# Patient Record
Sex: MALE | Race: BLACK OR AFRICAN AMERICAN | NOT HISPANIC OR LATINO | Employment: OTHER | URBAN - METROPOLITAN AREA
[De-identification: names, ages, dates, MRNs, and addresses within clinical notes are randomized per-mention and may not be internally consistent; named-entity substitution may affect disease eponyms.]

---

## 2017-01-04 ENCOUNTER — TELEPHONE (OUTPATIENT)
Dept: SLEEP MEDICINE | Facility: OTHER | Age: 47
End: 2017-01-04

## 2017-01-06 ENCOUNTER — HOSPITAL ENCOUNTER (EMERGENCY)
Facility: HOSPITAL | Age: 47
Discharge: HOME | End: 2017-01-06
Attending: EMERGENCY MEDICINE
Payer: MEDICAID

## 2017-01-06 VITALS
TEMPERATURE: 98 F | SYSTOLIC BLOOD PRESSURE: 133 MMHG | OXYGEN SATURATION: 93 % | RESPIRATION RATE: 22 BRPM | WEIGHT: 315 LBS | BODY MASS INDEX: 47.74 KG/M2 | HEIGHT: 68 IN | DIASTOLIC BLOOD PRESSURE: 72 MMHG | HEART RATE: 88 BPM

## 2017-01-06 DIAGNOSIS — R30.0 DYSURIA: ICD-10-CM

## 2017-01-06 DIAGNOSIS — R06.2 WHEEZING: Primary | ICD-10-CM

## 2017-01-06 DIAGNOSIS — R51.9 LEFT TEMPORAL HEADACHE: ICD-10-CM

## 2017-01-06 DIAGNOSIS — Z87.09 HISTORY OF COPD: ICD-10-CM

## 2017-01-06 LAB
BACTERIA #/AREA URNS HPF: NORMAL /HPF
BILIRUB UR QL STRIP: NEGATIVE
CLARITY UR: CLEAR
COLOR UR: ABNORMAL
GLUCOSE UR QL STRIP: ABNORMAL
HGB UR QL STRIP: NEGATIVE
HYALINE CASTS #/AREA URNS LPF: 1 /LPF
KETONES UR QL STRIP: NEGATIVE
LEUKOCYTE ESTERASE UR QL STRIP: NEGATIVE
MICROSCOPIC COMMENT: NORMAL
NITRITE UR QL STRIP: NEGATIVE
PH UR STRIP: 6 [PH] (ref 5–8)
PROT UR QL STRIP: ABNORMAL
RBC #/AREA URNS HPF: 1 /HPF (ref 0–4)
SP GR UR STRIP: 1 (ref 1–1.03)
TROPONIN I SERPL DL<=0.01 NG/ML-MCNC: <0.006 NG/ML
URN SPEC COLLECT METH UR: ABNORMAL
UROBILINOGEN UR STRIP-ACNC: NEGATIVE EU/DL
WBC #/AREA URNS HPF: 2 /HPF (ref 0–5)

## 2017-01-06 PROCEDURE — 94761 N-INVAS EAR/PLS OXIMETRY MLT: CPT

## 2017-01-06 PROCEDURE — 84484 ASSAY OF TROPONIN QUANT: CPT

## 2017-01-06 PROCEDURE — 94640 AIRWAY INHALATION TREATMENT: CPT

## 2017-01-06 PROCEDURE — 93005 ELECTROCARDIOGRAM TRACING: CPT

## 2017-01-06 PROCEDURE — 99284 EMERGENCY DEPT VISIT MOD MDM: CPT | Mod: 25

## 2017-01-06 PROCEDURE — 25000242 PHARM REV CODE 250 ALT 637 W/ HCPCS: Performed by: PHYSICIAN ASSISTANT

## 2017-01-06 PROCEDURE — 81000 URINALYSIS NONAUTO W/SCOPE: CPT

## 2017-01-06 RX ORDER — IPRATROPIUM BROMIDE AND ALBUTEROL SULFATE 2.5; .5 MG/3ML; MG/3ML
3 SOLUTION RESPIRATORY (INHALATION)
Status: COMPLETED | OUTPATIENT
Start: 2017-01-06 | End: 2017-01-06

## 2017-01-06 RX ORDER — AZITHROMYCIN 250 MG/1
250 TABLET, FILM COATED ORAL DAILY
Qty: 8 TABLET | Refills: 0 | Status: SHIPPED | OUTPATIENT
Start: 2017-01-06 | End: 2017-01-13

## 2017-01-06 RX ORDER — GABAPENTIN 400 MG/1
400 CAPSULE ORAL 3 TIMES DAILY
Qty: 90 CAPSULE | Refills: 0 | Status: SHIPPED | OUTPATIENT
Start: 2017-01-06 | End: 2017-09-17 | Stop reason: ALTCHOICE

## 2017-01-06 RX ADMIN — IPRATROPIUM BROMIDE AND ALBUTEROL SULFATE 3 ML: .5; 3 SOLUTION RESPIRATORY (INHALATION) at 03:01

## 2017-01-06 NOTE — ED TRIAGE NOTES
Pt states that have Lt ear pain over the entire left side of head and poor hearing.  Complaining of headache.  States that pain is 5 out of 10 at this time.

## 2017-01-06 NOTE — ED AVS SNAPSHOT
[facility]  [place] [address]               Rajendra Al Jr.   2017  2:20 PM   ED    Description:  Male : 1970   Department:  [facility]           Your Care was Coordinated By:     Provider Role From To    Srinivas Hart MD Attending Provider 17 1424 --    Ravi Kruger PA-C Physician Assistant 17 1421 --      Reason for Visit     Abscess           Diagnoses this Visit        Comments    Wheezing    -  Primary     History of COPD         Dysuria         Left temporal headache           ED Disposition     None           To Do List           Follow-up Information     Follow up with Aminata Hdez MD. Schedule an appointment as soon as possible for a visit in 1 day.    Specialty:  Internal Medicine    Why:  For reevaluation    Contact information:    [address] [facility] [address]  [facility] [address]  [phone]          Go to [facility].    Specialty:  Emergency Medicine    Why:  If symptoms worsen    Contact information:    [address] [place] [id number] [phone]       These Medications        Disp Refills Start End    azithromycin (Z-SOLITARIO) 250 MG tablet 8 tablet 0 2017    Take 1 tablet (250 mg total) by mouth once daily. Take first 2 tablets together, then 1 every day until finished. - Oral    Pharmacy: [facility]/pharmacy #[id number] - [place] - [address] [facility] Ph #: [phone]       gabapentin (NEURONTIN) 400 MG capsule 90 capsule 0 2017    Take 1 capsule (400 mg total) by mouth 3 (three) times daily. - Oral    Pharmacy: [facility]/pharmacy #[id number] - [place] - [address] [facility] Ph #: [phone]         [facility]     [facility] -  Assistance  Registered nurses in the [facility] provide clinical advisement, health education, appointment booking, and other advisory services.  Call for this free  service at [phone].             Medications           Message regarding Medications     Verify the changes and/or additions to your medication regime listed below are the same as discussed with your clinician today.  If any of these changes or additions are incorrect, please notify your healthcare provider.        START taking these NEW medications        Refills    azithromycin (Z-SOLITARIO) 250 MG tablet 0    Sig: Take 1 tablet (250 mg total) by mouth once daily. Take first 2 tablets together, then 1 every day until finished.    Class: Print    Route: Oral    gabapentin (NEURONTIN) 400 MG capsule 0    Sig: Take 1 capsule (400 mg total) by mouth 3 (three) times daily.    Class: Print    Route: Oral      These medications were administered today        Dose Freq    albuterol-ipratropium 2.5mg-0.5mg/3mL nebulizer solution 3 mL 3 mL ED 1 Time    Sig: Take 3 mLs by nebulization ED 1 Time.    Class: Normal    Route: Nebulization      STOP taking these medications     acetaminophen (TYLENOL) 500 MG tablet Take 1 tablet (500 mg total) by mouth every 6 (six) hours as needed for Pain.    aspirin (ECOTRIN) 81 MG EC tablet Take 1 tablet (81 mg total) by mouth once daily.    senna-docusate 8.6-50 mg (PERICOLACE) 8.6-50 mg per tablet Take 1 tablet by mouth 2 (two) times daily.           Verify that the below list of medications is an accurate representation of the medications you are currently taking.  If none reported, the list may be blank. If incorrect, please contact your healthcare provider. Carry this list with you in case of emergency.           Current Medications     albuterol (PROVENTIL HFA) 90 mcg/actuation inhaler Inhale 2 puffs into the lungs every 6 (six) hours as needed for Wheezing.    amlodipine (NORVASC) 10 MG tablet Take 10 mg by mouth once daily.     APIDRA SOLOSTAR 100 unit/mL InPn pen Inject 25 Units into the skin 3 (three) times daily.     atorvastatin (LIPITOR) 20 MG tablet Take 1 tablet (20 mg total)  "by mouth once daily.    BD INSULIN PEN NEEDLE UF SHORT 31 X 5/16 " Ndle     erythromycin 250 mg EC capsule Take 1 capsule (250 mg total) by mouth once daily.    fish oil-omega-3 fatty acids 300-1,000 mg capsule Take 1 g by mouth once daily.     fluticasone (FLONASE) 50 mcg/actuation nasal spray 1 spray by Each Nare route once daily.    furosemide (LASIX) 40 MG tablet Take 40 mg by mouth once daily.     hydrOXYzine (ATARAX) 50 MG tablet Take 50 mg by mouth every evening.     LANTUS SOLOSTAR 100 unit/mL (3 mL) InPn pen Inject 80 Units into the skin every evening.     lisinopril (PRINIVIL,ZESTRIL) 40 MG tablet Take 40 mg by mouth once daily.    metoprolol succinate (TOPROL-XL) 100 MG 24 hr tablet Take 150 mg by mouth once daily.     potassium chloride SA (K-DUR,KLOR-CON) 20 MEQ tablet Take 1 tablet (20 mEq total) by mouth 2 (two) times daily.    sertraline (ZOLOFT) 50 MG tablet Take 50 mg by mouth once daily.    spironolactone (ALDACTONE) 25 MG tablet Take 25 mg by mouth once daily.     trazodone (DESYREL) 150 MG tablet Take 100 mg by mouth nightly.     TRUETEST TEST STRIPS Strp Check blood sugar once daily    azithromycin (Z-SOLITARIO) 250 MG tablet Take 1 tablet (250 mg total) by mouth once daily. Take first 2 tablets together, then 1 every day until finished.    cyclobenzaprine (FLEXERIL) 10 MG tablet Take 10 mg by mouth every evening.     gabapentin (NEURONTIN) 400 MG capsule Take 1 capsule (400 mg total) by mouth 3 (three) times daily.           Clinical Reference Information           Your Vitals Were     BP Pulse Temp Resp Height Weight    133/72 (BP Location: Left arm, Patient Position: Sitting, BP Method: Automatic) 88 98 °F (36.7 °C) 22 5' 8" (1.727 m) 149.7 kg (330 lb)    SpO2 BMI             93% 50.18 kg/m2         Allergies as of 1/6/2017     No Known Allergies      Immunizations Administered on Date of Encounter - 1/6/2017     None      ED Micro, Lab, POCT     Start Ordered       Status Ordering Provider    " 01/06/17 1453 01/06/17 1452  Troponin I  STAT      Final result     01/06/17 1446 01/06/17 1446  Urinalysis  STAT      Final result     01/06/17 1446 01/06/17 1446  Urinalysis Microscopic  Once      Final result       ED Imaging Orders     Start Ordered       Status Ordering Provider    01/06/17 1448 01/06/17 1449  X-Ray Chest PA And Lateral  1 time imaging      Final result       Discharge References/Attachments     TRIGEMINAL NEURALGIA (ENGLISH)      Your Scheduled Appointments     Jan 11, 2017 10:00 AM CST   Established Patient Visit with Rigo oRe MD   [facility] ([facility] )    [address]  [place] [address] [phone]            Jan 12, 2017  7:30 PM CST   Sleep Study with LAB, SLEEP   [facility] ([facility])    [address] [facility] [address] [phone]              Smoking Cessation     If you would like to quit smoking:   You may be eligible for free services if you are a [place] resident and started smoking cigarettes before September 1, 1988.  Call the Smoking Cessation Trust (SCT) toll free at (971) 092-2266 or (457) 530-8231.   Call 0-800-QUIT-NOW if you do not meet the above criteria.             [facility] complies with applicable Federal civil rights laws and does not discriminate on the basis of race, color, national origin, age, disability, or sex.        Language Assistance Services     ATTENTION: Language assistance services are available, free of charge. Please call [phone].      ATENCIÓN: Si habla español, tiene a miguel disposición servicios gratuitos de asistencia lingüística. Llame al [phone].     CHÚ Ý: N?u b?n nói Ti?ng Vi?t, có các d?ch v? h? tr? ngôn ng? mi?n phí dành cho b?n. G?i s? [phone].

## 2017-01-06 NOTE — ED PROVIDER NOTES
"Encounter Date: 1/6/2017    SCRIBE #1 NOTE: I, Selam Dejesus, am scribing for, and in the presence of,  Ravi DIAZ. I have scribed the following portions of the note - Other sections scribed: HPI, ROS.       History     Chief Complaint   Patient presents with    Abscess     pt states " I feel like an abscess developing to the (L) side of my head and I'm having (L) ear pain x 3 days."     Review of patient's allergies indicates:  No Known Allergies  HPI Comments: CC: Facial Pain    45 y/o male with schizophrenia, CHF, COPD, CAD, DM, HTN, and obesity presents to the ED c/o 3 day hx of acute onset L sided facial pain that radiates to L ear and to parietal head. Symptoms are moderate and intermittent. Pt attempted ear drops this morning with no relief. Pt also c/o dysuria, mild chest pain, and intermittent cough. Pt denies being sexually active. Pt reports frequent UTI in the past. Pt denies penile discharge, fever, cough, chills, testicular pain, hematuria, or jaw pain. No alleviating/exacerbating factors or other symptoms reported.    The history is provided by the patient. No  was used.     Past Medical History   Diagnosis Date    CHF (congestive heart failure)     COPD (chronic obstructive pulmonary disease)     Coronary artery disease     Diabetes mellitus     H/O medication noncompliance     Hypertension     ILD (interstitial lung disease)     Obesity     Ptosis of eyelid, bilateral      Past Medical History Pertinent Negatives   Diagnosis Date Noted    Anticoagulant long-term use 7/26/2013    Arthritis 7/26/2013    Asthma 7/26/2013    Blood transfusion 7/26/2013    Cancer 7/26/2013    Seizures 7/26/2013    Stroke 7/26/2013    Thyroid disease 7/26/2013    Transfusion reaction 7/26/2013     Past Surgical History   Procedure Laterality Date    Lung biopsy      Cataract extraction, bilateral       as child    Belpharoptosis repair      Left heart catherization   "    Eye surgery       Family History   Problem Relation Age of Onset    Cancer Maternal Grandmother     Hyperlipidemia Father     Diabetes Mother     Diabetes Sister     Heart disease Sister     Hypertension Sister     Diabetes Brother     Diabetes Daughter     Diabetes Brother     Diabetes Brother     Diabetes Sister      Social History   Substance Use Topics    Smoking status: Former Smoker     Packs/day: 0.25     Years: 3.00     Quit date: 5/1/2012    Smokeless tobacco: None    Alcohol use No      Comment: occassionally     Review of Systems   Constitutional: Negative for chills and fever.   HENT: Positive for ear pain (L sided). Negative for rhinorrhea.         (+) L sided facial pain that radiates to L sided ear and to parietal head   Eyes: Negative for redness.   Respiratory: Positive for cough (intermittent) and shortness of breath (slightly increased from baseline).    Cardiovascular: Positive for chest pain (mild).   Gastrointestinal: Negative for abdominal pain, diarrhea, nausea and vomiting.   Genitourinary: Positive for dysuria. Negative for difficulty urinating, discharge and hematuria.   Musculoskeletal: Negative for gait problem and neck pain.   Skin: Negative for rash.   Neurological: Positive for headaches (parietal).       Physical Exam   Initial Vitals   BP Pulse Resp Temp SpO2   01/06/17 1251 01/06/17 1251 01/06/17 1251 01/06/17 1251 01/06/17 1251   137/90 96 24 98 °F (36.7 °C) 93 %     Physical Exam    Nursing note and vitals reviewed.  Constitutional: He appears well-developed and well-nourished. He is not diaphoretic. No distress.   HENT:   Head: Normocephalic and atraumatic.   Right Ear: External ear normal.   Left Ear: External ear normal.   Nose: Nose normal.   Mouth/Throat: Oropharynx is clear and moist. No oropharyngeal exudate.   No reproducible TTP, swelling, warmth, or erythema to jaw, face, or scalp. No pain with fully opening or closing jaw. No evidence of dental  abscess or PTA. No mastoid TTP. TM grey and intact. No erythema or swelling of throat.   Eyes: Conjunctivae and EOM are normal. Pupils are equal, round, and reactive to light. Right eye exhibits no discharge. Left eye exhibits no discharge.   Neck: Normal range of motion. No tracheal deviation present. No JVD present.   Cardiovascular: Normal rate, regular rhythm and normal heart sounds. Exam reveals no friction rub.    No murmur heard.  Pulmonary/Chest: No accessory muscle usage or stridor. No tachypnea. No respiratory distress. He has no decreased breath sounds. He has wheezes (diffuse end expiratory). He has no rhonchi. He has no rales. He exhibits no tenderness.   Personal portable oxygen cylinder in room, not attached to patient.    Musculoskeletal: Normal range of motion.   Lymphadenopathy:     He has no cervical adenopathy.   Neurological: He is alert and oriented to person, place, and time.   Skin: Skin is warm and dry. No rash and no abscess noted. No erythema. No pallor.         ED Course   Procedures  Labs Reviewed   URINALYSIS - Abnormal; Notable for the following:        Result Value    Protein, UA 1+ (*)     Glucose, UA 1+ (*)     All other components within normal limits   TROPONIN I   URINALYSIS MICROSCOPIC     EKG Readings: (Independently Interpreted)   Patient is in a normal sinus rhythm with a heart rate of 86.  The WA intervals normal QRS intervals normal.  The patient is a prolonged QT interval.  There are no significant ST segment changes that some nonspecific T-wave changes.  Her is poor R-wave progression across precordium.  There is no evidence of acute myocardial infarction or malignant arrhythmia.       X-Rays:   Independently Interpreted Readings:   Chest X-Ray: Possible PNA     Medical Decision Making:   History:   Old Medical Records: I decided to obtain old medical records.    This is an emergent evaluation of a 46 y.o. male with a PMHx of schizophrenia, HTN, CHF, DM, COPD, and CAD  "presenting to the ED for multiple complaints. Notes L temporal HA pain; atraumatic. Also notes slightly increased work of breathing from his baseline and "slight" CP. Also notes dysuria with no testicle pain or abdominal pain. Denies fever, nausea, vomiting, and sore throat. RR 24, /90, SpO2 93% RA, afebrile (similar to baseline per chart review). Patient is non-toxic appearing and in no acute distress. No respiratory distress but does have diffuse end expiratory wheezing. No CVA TTP. No TTP of jaw, face, or head. No evidence of trauma or infection to head. Fully ranges jaw. UA shows no evidence of infection and I doubt pyelonephritis. Troponin WNL; ACS unlikely. CXR shows possible PNA, which I will cover with azithromycin. No evidence of AAA or PTX. Duoneb in ED improves symptoms. Dr. Hart evaluates patient for facial pain- advises it is likely neurological and to cover with gabapentin. No abscess or cellulitis. No dental abscess. No AOM, otitis externa, or mastoiditis. I doubt meningitis.     Discharged home with azithromycin and gabapentin. Instructed to follow up with PCP for reevaluation and management of symptoms.     I discussed with the patient the diagnosis, treatment plan, indications for return to the emergency department, and for expected follow-up. The patient verbalized an understanding. The patient is asked if there are any questions or concerns. We discuss the case, until all issues are addressed to the patients satisfaction. Patient understands and is agreeable to the plan.     I discussed this patient with Dr. Hart who also evaluated the patient and is in agreement with my assessment and plan.           Scribe Attestation:   Scribe #1: I performed the above scribed service and the documentation accurately describes the services I performed. I attest to the accuracy of the note.    Attending Attestation:     Physician Attestation Statement for NP/PA:   I have conducted a face to face " encounter with this patient in addition to the NP/PA, due to    Other NP/PA Attestation Additions:     Physical Exam: Patient is morbidly obese.  The patient has mild bilateral wheezing.  There is no evidence of respiratory distress.  The left tympanic membrane is normal the canal is normal.  There is no swelling tenderness or mass on the left parietal scalp.  The neck is supple.  Mental status examination, cranial nerves, motor and sensory examination are normal.   Medical Decision Making: Given the above, I'm wondering whether this patient has neuropathic pain in the left parietal scalp.  Tic douloureux is considered.  This patient comes off and on is brief and involves the ear it involves the left parietal head.  There is no evidence of otitis media or otitis externa odontogenic infection.  The patient has very vague chest pain complaints.  He's been evaluated for chest pain in the past.  His EKG was unremarkable.  We will look at her chest x-ray.  We will check urine to be sure that there is no urinary tract infection.       Physician Attestation for Scribe:  Physician Attestation Statement for Scribe #1: I, Ravi Richards PA-C, reviewed documentation, as scribed by Selam Dejesus in my presence, and it is both accurate and complete.                 ED Course     Clinical Impression:   The primary encounter diagnosis was Wheezing. Diagnoses of History of COPD, Dysuria, and Left temporal headache were also pertinent to this visit.          Ravi Kruger PA-C  01/06/17 1862       Srinivas Hart MD  01/07/17 7919

## 2017-01-13 ENCOUNTER — PATIENT MESSAGE (OUTPATIENT)
Dept: ADMINISTRATIVE | Facility: OTHER | Age: 47
End: 2017-01-13

## 2017-01-13 ENCOUNTER — TELEPHONE (OUTPATIENT)
Dept: SLEEP MEDICINE | Facility: OTHER | Age: 47
End: 2017-01-13

## 2017-01-18 ENCOUNTER — TELEPHONE (OUTPATIENT)
Dept: SLEEP MEDICINE | Facility: OTHER | Age: 47
End: 2017-01-18

## 2017-01-18 ENCOUNTER — PATIENT MESSAGE (OUTPATIENT)
Dept: ADMINISTRATIVE | Facility: OTHER | Age: 47
End: 2017-01-18

## 2017-01-18 ENCOUNTER — HOSPITAL ENCOUNTER (EMERGENCY)
Facility: HOSPITAL | Age: 47
Discharge: LEFT BEFORE DISCHARGE | End: 2017-01-18
Payer: MEDICAID

## 2017-01-18 VITALS
WEIGHT: 315 LBS | DIASTOLIC BLOOD PRESSURE: 104 MMHG | SYSTOLIC BLOOD PRESSURE: 185 MMHG | RESPIRATION RATE: 22 BRPM | HEART RATE: 74 BPM | HEIGHT: 68 IN | TEMPERATURE: 98 F | BODY MASS INDEX: 47.74 KG/M2 | OXYGEN SATURATION: 94 %

## 2017-01-18 LAB
ALBUMIN SERPL BCP-MCNC: 3.4 G/DL
ALP SERPL-CCNC: 94 U/L
ALT SERPL W/O P-5'-P-CCNC: 13 U/L
ANION GAP SERPL CALC-SCNC: 9 MMOL/L
AST SERPL-CCNC: 10 U/L
BACTERIA #/AREA URNS HPF: NORMAL /HPF
BASOPHILS # BLD AUTO: 0.02 K/UL
BASOPHILS NFR BLD: 0.2 %
BILIRUB SERPL-MCNC: 0.4 MG/DL
BILIRUB UR QL STRIP: NEGATIVE
BUN SERPL-MCNC: 16 MG/DL
CALCIUM SERPL-MCNC: 9.5 MG/DL
CHLORIDE SERPL-SCNC: 101 MMOL/L
CLARITY UR: CLEAR
CO2 SERPL-SCNC: 28 MMOL/L
COLOR UR: YELLOW
CREAT SERPL-MCNC: 1.6 MG/DL
DIFFERENTIAL METHOD: ABNORMAL
EOSINOPHIL # BLD AUTO: 0.3 K/UL
EOSINOPHIL NFR BLD: 3.5 %
ERYTHROCYTE [DISTWIDTH] IN BLOOD BY AUTOMATED COUNT: 17 %
EST. GFR  (AFRICAN AMERICAN): 58 ML/MIN/1.73 M^2
EST. GFR  (NON AFRICAN AMERICAN): 50 ML/MIN/1.73 M^2
GLUCOSE SERPL-MCNC: 208 MG/DL
GLUCOSE UR QL STRIP: ABNORMAL
HCT VFR BLD AUTO: 49.4 %
HGB BLD-MCNC: 15.3 G/DL
HGB UR QL STRIP: NEGATIVE
HYALINE CASTS #/AREA URNS LPF: 0 /LPF
KETONES UR QL STRIP: NEGATIVE
LEUKOCYTE ESTERASE UR QL STRIP: NEGATIVE
LYMPHOCYTES # BLD AUTO: 1.5 K/UL
LYMPHOCYTES NFR BLD: 16.4 %
MCH RBC QN AUTO: 25 PG
MCHC RBC AUTO-ENTMCNC: 31 %
MCV RBC AUTO: 81 FL
MICROSCOPIC COMMENT: NORMAL
MONOCYTES # BLD AUTO: 0.8 K/UL
MONOCYTES NFR BLD: 8.6 %
NEUTROPHILS # BLD AUTO: 6.6 K/UL
NEUTROPHILS NFR BLD: 71.3 %
NITRITE UR QL STRIP: NEGATIVE
PH UR STRIP: 5 [PH] (ref 5–8)
PLATELET # BLD AUTO: 274 K/UL
PMV BLD AUTO: 8.8 FL
POCT GLUCOSE: 179 MG/DL (ref 70–110)
POTASSIUM SERPL-SCNC: 4.8 MMOL/L
PROT SERPL-MCNC: 8.1 G/DL
PROT UR QL STRIP: ABNORMAL
RBC # BLD AUTO: 6.12 M/UL
RBC #/AREA URNS HPF: 0 /HPF (ref 0–4)
SODIUM SERPL-SCNC: 138 MMOL/L
SP GR UR STRIP: 1.01 (ref 1–1.03)
SQUAMOUS #/AREA URNS HPF: 1 /HPF
URN SPEC COLLECT METH UR: ABNORMAL
UROBILINOGEN UR STRIP-ACNC: NEGATIVE EU/DL
WBC # BLD AUTO: 9.19 K/UL
WBC #/AREA URNS HPF: 1 /HPF (ref 0–5)

## 2017-01-18 PROCEDURE — 82962 GLUCOSE BLOOD TEST: CPT

## 2017-01-18 PROCEDURE — 81000 URINALYSIS NONAUTO W/SCOPE: CPT

## 2017-01-18 PROCEDURE — 85025 COMPLETE CBC W/AUTO DIFF WBC: CPT

## 2017-01-18 PROCEDURE — 80053 COMPREHEN METABOLIC PANEL: CPT

## 2017-01-18 PROCEDURE — 99283 EMERGENCY DEPT VISIT LOW MDM: CPT | Mod: 25

## 2017-01-19 NOTE — ED TRIAGE NOTES
Pt arrives to ED via personal transportation with c/o decreased urine output x couple days. Reports taking at home O2 at 3L, RA O2 sats 94% in triage. Hx of COPD and CHF. Denies SOB or any other symptoms at this time.

## 2017-01-19 NOTE — ED NOTES
"Pt to triage desk states, "who do I need to talk to because I want my gender changed on my chart."   He is upset and not wanting to answer questions.    "

## 2017-01-19 NOTE — ED NOTES
"Pt seems upset and voices his concerns to Agnes THOMAS, pt states "i want my gender changed on my chart."  "

## 2017-01-19 NOTE — ED PROVIDER NOTES
Triage Assessment:  I have evaluated and performed a medical screening assessment on this patient while awaiting a thorough evaluation and treatment in an ED bed. All of the emergency department beds are occupied at this time. When appropriate, laboratory testing and radiology studies will be ordered from triage. The patient has been advised of this process and care plan.    This is a 47 year old male with HTN, CHF, DM and COPD who presents to the ED complaining of concern for fluid overload. He reports dyspnea on exertion. He has been taking his prescribed Lasix, but believes the pills are fake and is requesting IV Lasix. He denies leg swelling, CP, N/V/D. He is on 2L O2 nasal cannula at home. While in the lobby, patient became agitated and began to leave, but was shortly escorted to triage. He states he is upset because in his chart he is designated as a male, but recently began to question his gender. He is not taking hormones or had surgery. He denies homicidal/suicidal ideation. He states is agreeable to blood work but refuses a chest xray and EKG. I informed him that it is necessary to check electrolytes prior to Lasix administration.      Orders pending:  () none  () Radiology studies  (X) Labs: UA, CBC, CMP  () medication    Dispo:  (X) Qtrack  () Main ED     Agnes Ochoa NP  01/18/17 0654

## 2017-01-20 ENCOUNTER — NURSE TRIAGE (OUTPATIENT)
Dept: ADMINISTRATIVE | Facility: CLINIC | Age: 47
End: 2017-01-20

## 2017-01-21 NOTE — TELEPHONE ENCOUNTER
Reason for Disposition   Lab result questions   Caller requesting lab results    Protocols used: ST INFORMATION ONLY CALL-A-AH, ST PCP CALL - NO TRIAGE-A-  States lab and urinalysis were done in ER but he didnt get results    Advised pt to call pcp Monday for results    Madelyn Baltazar RN

## 2017-02-01 ENCOUNTER — TELEPHONE (OUTPATIENT)
Dept: SLEEP MEDICINE | Facility: OTHER | Age: 47
End: 2017-02-01

## 2017-02-27 ENCOUNTER — HOSPITAL ENCOUNTER (EMERGENCY)
Facility: HOSPITAL | Age: 47
Discharge: LEFT BEFORE DISCHARGE | End: 2017-02-28
Attending: EMERGENCY MEDICINE
Payer: MEDICAID

## 2017-02-27 DIAGNOSIS — R07.9 CHEST PAIN, UNSPECIFIED TYPE: Primary | ICD-10-CM

## 2017-02-27 LAB
ALBUMIN SERPL BCP-MCNC: 3.2 G/DL
ALP SERPL-CCNC: 85 U/L
ALT SERPL W/O P-5'-P-CCNC: 13 U/L
ANION GAP SERPL CALC-SCNC: 9 MMOL/L
ANISOCYTOSIS BLD QL SMEAR: SLIGHT
AST SERPL-CCNC: 27 U/L
BASOPHILS # BLD AUTO: 0.02 K/UL
BASOPHILS NFR BLD: 0.2 %
BILIRUB SERPL-MCNC: 0.6 MG/DL
BNP SERPL-MCNC: 11 PG/ML
BUN SERPL-MCNC: 15 MG/DL
CALCIUM SERPL-MCNC: 9 MG/DL
CHLORIDE SERPL-SCNC: 106 MMOL/L
CO2 SERPL-SCNC: 22 MMOL/L
CREAT SERPL-MCNC: 1.3 MG/DL
DIFFERENTIAL METHOD: ABNORMAL
EOSINOPHIL # BLD AUTO: 0.1 K/UL
EOSINOPHIL NFR BLD: 1.5 %
ERYTHROCYTE [DISTWIDTH] IN BLOOD BY AUTOMATED COUNT: 18.3 %
EST. GFR  (AFRICAN AMERICAN): >60 ML/MIN/1.73 M^2
EST. GFR  (NON AFRICAN AMERICAN): >60 ML/MIN/1.73 M^2
GLUCOSE SERPL-MCNC: 117 MG/DL
HCT VFR BLD AUTO: 47.9 %
HGB BLD-MCNC: 15.4 G/DL
INR PPP: 1.1
LYMPHOCYTES # BLD AUTO: 1.5 K/UL
LYMPHOCYTES NFR BLD: 15.2 %
MAGNESIUM SERPL-MCNC: 2.3 MG/DL
MCH RBC QN AUTO: 25.5 PG
MCHC RBC AUTO-ENTMCNC: 32.2 %
MCV RBC AUTO: 79 FL
MONOCYTES # BLD AUTO: 0.7 K/UL
MONOCYTES NFR BLD: 7.3 %
NEUTROPHILS # BLD AUTO: 7.2 K/UL
NEUTROPHILS NFR BLD: 75.8 %
PLATELET # BLD AUTO: 233 K/UL
PLATELET BLD QL SMEAR: ABNORMAL
PMV BLD AUTO: 9.7 FL
POLYCHROMASIA BLD QL SMEAR: ABNORMAL
POTASSIUM SERPL-SCNC: 4.7 MMOL/L
PROT SERPL-MCNC: 7.8 G/DL
PROTHROMBIN TIME: 11.2 SEC
RBC # BLD AUTO: 6.04 M/UL
SODIUM SERPL-SCNC: 137 MMOL/L
TROPONIN I SERPL DL<=0.01 NG/ML-MCNC: <0.006 NG/ML
WBC # BLD AUTO: 9.56 K/UL

## 2017-02-27 PROCEDURE — 85025 COMPLETE CBC W/AUTO DIFF WBC: CPT

## 2017-02-27 PROCEDURE — 25000003 PHARM REV CODE 250: Performed by: EMERGENCY MEDICINE

## 2017-02-27 PROCEDURE — 99285 EMERGENCY DEPT VISIT HI MDM: CPT | Mod: 25

## 2017-02-27 PROCEDURE — 10061 I&D ABSCESS COMP/MULTIPLE: CPT | Mod: ,,, | Performed by: EMERGENCY MEDICINE

## 2017-02-27 PROCEDURE — 80053 COMPREHEN METABOLIC PANEL: CPT

## 2017-02-27 PROCEDURE — 93010 ELECTROCARDIOGRAM REPORT: CPT | Mod: ,,, | Performed by: INTERNAL MEDICINE

## 2017-02-27 PROCEDURE — 83880 ASSAY OF NATRIURETIC PEPTIDE: CPT

## 2017-02-27 PROCEDURE — 85610 PROTHROMBIN TIME: CPT

## 2017-02-27 PROCEDURE — 99285 EMERGENCY DEPT VISIT HI MDM: CPT | Mod: 25,,, | Performed by: EMERGENCY MEDICINE

## 2017-02-27 PROCEDURE — 93005 ELECTROCARDIOGRAM TRACING: CPT

## 2017-02-27 PROCEDURE — 84484 ASSAY OF TROPONIN QUANT: CPT

## 2017-02-27 PROCEDURE — 83735 ASSAY OF MAGNESIUM: CPT

## 2017-02-27 RX ORDER — ASPIRIN 325 MG
325 TABLET ORAL
Status: COMPLETED | OUTPATIENT
Start: 2017-02-27 | End: 2017-02-27

## 2017-02-27 RX ADMIN — ASPIRIN 325 MG ORAL TABLET 325 MG: 325 PILL ORAL at 10:02

## 2017-02-27 NOTE — ED AVS SNAPSHOT
[facility]  [address]  [place] [address]               Rajendra Al Jr.   2017  9:41 PM   ED    Description:  Male : 1970   Department:  [facility]           Your Care was Coordinated By:     Provider Role From To    Armand Juan MD Attending Provider [phone] --      Reason for Visit     Chest Pain     multiple medical complaints           Diagnoses this Visit        Comments    Chest pain, unspecified type    -  Primary       ED Disposition     None           To Do List           Follow-up Information     Follow up with [facility]. Schedule an appointment as soon as possible for a visit in 2 days.    Specialty:  Cardiology    Why:  If symptoms worsen    Contact information:    [address]  [facility] [id number] [phone]    Additional information:    3rd floor      [facility]     [facility] -  Assistance  Registered nurses in the [facility] provide clinical advisement, health education, appointment booking, and other advisory services.  Call for this free service at [phone].             Medications           Message regarding Medications     Verify the changes and/or additions to your medication regime listed below are the same as discussed with your clinician today.  If any of these changes or additions are incorrect, please notify your healthcare provider.        These medications were administered today        Dose Freq    aspirin tablet 325 mg 325 mg ED 1 Time    Sig: Take 1 tablet (325 mg total) by mouth ED 1 Time.    Class: Normal    Route: Oral           Verify that the below list of medications is an accurate representation of the medications you are currently taking.  If none reported, the list may be blank. If incorrect, please contact your healthcare provider. Carry this list with you in case of emergency.           Current  "Medications     albuterol (PROVENTIL HFA) 90 mcg/actuation inhaler Inhale 2 puffs into the lungs every 6 (six) hours as needed for Wheezing.    amlodipine (NORVASC) 10 MG tablet Take 10 mg by mouth once daily.     APIDRA SOLOSTAR 100 unit/mL InPn pen Inject 25 Units into the skin 3 (three) times daily.     atorvastatin (LIPITOR) 20 MG tablet Take 1 tablet (20 mg total) by mouth once daily.    BD INSULIN PEN NEEDLE UF SHORT 31 X 5/16 " Ndle     cyclobenzaprine (FLEXERIL) 10 MG tablet Take 10 mg by mouth every evening.     fish oil-omega-3 fatty acids 300-1,000 mg capsule Take 1 g by mouth once daily.     fluticasone (FLONASE) 50 mcg/actuation nasal spray 1 spray by Each Nare route once daily.    furosemide (LASIX) 40 MG tablet Take 40 mg by mouth once daily.     hydrOXYzine (ATARAX) 50 MG tablet Take 50 mg by mouth every evening.     LANTUS SOLOSTAR 100 unit/mL (3 mL) InPn pen Inject 80 Units into the skin every evening.     lisinopril (PRINIVIL,ZESTRIL) 40 MG tablet Take 40 mg by mouth once daily.    metoprolol succinate (TOPROL-XL) 100 MG 24 hr tablet Take 150 mg by mouth once daily.     potassium chloride SA (K-DUR,KLOR-CON) 20 MEQ tablet Take 1 tablet (20 mEq total) by mouth 2 (two) times daily.    sertraline (ZOLOFT) 50 MG tablet Take 50 mg by mouth once daily.    spironolactone (ALDACTONE) 25 MG tablet Take 25 mg by mouth once daily.     trazodone (DESYREL) 150 MG tablet Take 100 mg by mouth nightly.     TRUETEST TEST STRIPS Strp Check blood sugar once daily    gabapentin (NEURONTIN) 400 MG capsule Take 1 capsule (400 mg total) by mouth 3 (three) times daily.           Clinical Reference Information           Your Vitals Were     BP                   135/78           Allergies as of 2/28/2017     No Known Allergies      Immunizations Administered on Date of Encounter - 2/28/2017     None      ED Micro, Lab, POCT     Start Ordered       Status Ordering Provider    02/27/17 2216 02/27/17 2214  CBC auto " differential  STAT      Final result     02/27/17 2216 02/27/17 2215  Comprehensive metabolic panel  STAT      Final result     02/27/17 2216 02/27/17 2215  Protime-INR  STAT      Final result     02/27/17 2216 02/27/17 2215  Troponin I  Now then every 3 hours     Comments:  PLEASE REVIEW ORDER START TIME BEFORE MARKING SPECIMEN COLLECTED.   Start Status   02/27/17 2216 Final result   02/28/17 0116 Acknowledged       Acknowledged     02/27/17 2216 02/27/17 2216  B-Type natriuretic peptide (BNP)  STAT      Final result     02/27/17 2216 02/27/17 2216  Magnesium  STAT      Final result       ED Imaging Orders     Start Ordered       Status Ordering Provider    02/27/17 2216 02/27/17 2216  X-Ray Chest PA And Lateral  1 time imaging      Final result         Discharge Instructions         Uncertain Causes of Chest Pain    Chest pain can happen for a number of reasons. Sometimes the cause can't be determined. If your condition does not seem serious, and your pain does not appear to be coming from your heart, your healthcare provider may recommend watching it closely. Sometimes the signs of a serious problem take more time to appear. Many problems not related to your heart can cause chest pain.These include:  · Musculoskeletal. Costochondritis, an inflammation of the tissues around the ribs that can occur from trauma or overuse injuries  · Respiratory. Pneumonia, pneumothorax, or pneumonitis (inflammation of the lining of the chest and lungs)  · Gastrointestinal. Esophageal reflux, heartburn, or gallbladder disease  · Anxiety and panic disorders  · Nerve compression and neuritis  · Miscellaneous problems such as aortic aneurysm or pulmonary embolism (a blood clot in the lungs)  Home care  After your visit, follow these recommendations:  · Rest today and avoid strenuous activity.  · Take any prescribed medicine as directed.  · Be aware of any recurrent chest pain and notice any changes  Follow-up care  Follow up with your  healthcare provider if you do not start to feel better within 24 hours, or as advised.  Call 911  Call 911 if any of these occur:  · A change in the type of pain: if it feels different, becomes more severe, lasts longer, or begins to spread into your shoulder, arm, neck, jaw or back  · Shortness of breath or increased pain with breathing  · Weakness, dizziness, or fainting  · Rapid heart beat  · Crushing sensation in your chest  When to seek medical advice  Call your healthcare provider right away if any of the following occur:  · Cough with dark colored sputum (phlegm) or blood  · Fever of 100.4ºF (38ºC) or higher, or as directed by your healthcare provider  · Swelling, pain or redness in one leg  · Shortness of breath  Date Last Reviewed: 12/30/2015 © 2000-2016 [employer]. [address] [place] [address]. All rights reserved. This information is not intended as a substitute for professional medical care. Always follow your healthcare professional's instructions.          Smoking Cessation     If you would like to quit smoking:   You may be eligible for free services if you are a [place] resident and started smoking cigarettes before September 1, 1988.  Call the [employer]oking Cessation [employer] ([employer]) toll free at (112) 937-3403 or (008) 661-1766.   Call 1-800-QUIT-NOW if you do not meet the above criteria.             [facility] complies with applicable Federal civil rights laws and does not discriminate on the basis of race, color, national origin, age, disability, or sex.        Language Assistance Services     ATTENTION: Language assistance services are available, free of charge. Please call [phone].      ATENCIÓN: Si habla español, tiene a miguel disposición servicios gratuitos de asistencia lingüística. Llame al [phone].     CHÚ Ý: N?u b?n nói Ti?ng Vi?t, có các d?ch v? h? tr? ngôn ng? mi?n phí dành cho b?n. G?i s? [phone].

## 2017-02-28 VITALS
WEIGHT: 315 LBS | OXYGEN SATURATION: 97 % | DIASTOLIC BLOOD PRESSURE: 78 MMHG | SYSTOLIC BLOOD PRESSURE: 135 MMHG | TEMPERATURE: 98 F | RESPIRATION RATE: 12 BRPM | HEIGHT: 68 IN | BODY MASS INDEX: 47.74 KG/M2 | HEART RATE: 76 BPM

## 2017-02-28 NOTE — DISCHARGE INSTRUCTIONS

## 2017-02-28 NOTE — ED TRIAGE NOTES
Pt presents to ED c/o subternal chest pain and SOB that started today. Pt stated that the pain stays over his chest, is stabbing, and rated as 8/10. Pt denies N/V/D. Pt stated that he has also had a cough for the past few days.     LOC: Patient name and date of birth verified.  The patient is awake, alert and aware of environment with an appropriate affect, the patient is oriented x 3 and speaking appropriately.  Pt in NAD.    APPEARANCE: Patient resting comfortably and in no acute distress, patient is clean and well groomed, patient's clothing is properly fastened.  SKIN: The skin is warm and dry, color consistent with ethnicity, patient has normal skin turgor and moist mucus membranes, skin intact, no breakdown or brusing noted.  MUSCULOSKELETAL: Patient moving all extremities well, no obvious swelling or deformities noted.  RESPIRATORY: Airway is open and patent, respirations are spontaneous, patient has a normal effort and rate, no accessory muscle use noted.  CARDIAC: Patient has a normal rate and rhythm, no periphreal edema noted, capillary refill < 3 seconds. Substernal chest pain reported, pain does not radiate, is stabbing in character, and rated as 8/10 .   ABDOMEN: Soft and non tender to palpation, no distention noted. Bowel sounds present in all four quadrants.  NEUROLOGIC: Eyes open spontaneously, behavior appropriate to situation, follows commands, facial expression symmetrical, bilateral hand grasp equal and even, purposeful motor response noted, normal sensation in all extremities when touched with a finger.

## 2017-02-28 NOTE — ED PROVIDER NOTES
"Encounter Date: 2/27/2017    SCRIBE #1 NOTE: I, Antonio Souza, am scribing for, and in the presence of,  Dr. Juan. I have scribed the entire note.       History     Chief Complaint   Patient presents with    Chest Pain     intermittent chest pain X 3days with rest and exertion.      multiple medical complaints     c/o "head pain" for 3 days, also request full body cancer screen, states he is really worried about his lungs. Reports home O2 and noncompliance with home O2.      Review of patient's allergies indicates:  No Known Allergies  HPI Comments: Time seen by provider: 10:06 PM    This is a 47 y.o. male with a history of CAD, CHF, DM, HTN, and schizophrenia who presents to the ED for evaluation of centralized chest pain for the past few days described as a pressure, which is worse with exertion. He reports that he has experienced this pain before long ago, but is unsure what they diagnosed it as. The patient also endorses months of a waxing and waning headache, which became worse today. He states that he sometimes takes aspirin for his headache, but it only slightly alleviates his pain. Pt is experiencing associated SOB worse with exertion and a non-productive cough, but denies any fever or other symptoms. He states that he has been compliant with his medications, but he did not have a chance to take today's doses. The patient is currently asymptomatic.     The history is provided by the patient.     Past Medical History:   Diagnosis Date    CHF (congestive heart failure)     COPD (chronic obstructive pulmonary disease)     Coronary artery disease     Diabetes mellitus     H/O medication noncompliance     Hypertension     ILD (interstitial lung disease)     Obesity     Ptosis of eyelid, bilateral      Past Surgical History:   Procedure Laterality Date    BELPHAROPTOSIS REPAIR      CATARACT EXTRACTION, BILATERAL      as child    EYE SURGERY      left heart catherization      LUNG BIOPSY   "     Family History   Problem Relation Age of Onset    Cancer Maternal Grandmother     Hyperlipidemia Father     Diabetes Mother     Diabetes Sister     Heart disease Sister     Hypertension Sister     Diabetes Brother     Diabetes Daughter     Diabetes Brother     Diabetes Brother     Diabetes Sister      Social History   Substance Use Topics    Smoking status: Former Smoker     Packs/day: 0.25     Years: 3.00     Quit date: 5/1/2012    Smokeless tobacco: None    Alcohol use No      Comment: occassionally     Review of Systems   Constitutional: Negative for fever.   HENT: Negative for sore throat.    Respiratory: Positive for cough and shortness of breath.    Cardiovascular: Positive for chest pain.   Gastrointestinal: Negative for nausea.   Genitourinary: Negative for dysuria.   Musculoskeletal: Negative for back pain.   Skin: Negative for rash.   Neurological: Positive for headaches. Negative for weakness.   Hematological: Does not bruise/bleed easily.       Physical Exam   Initial Vitals   BP Pulse Resp Temp SpO2   02/27/17 1836 02/27/17 1836 02/27/17 1836 02/27/17 1836 02/27/17 1836   135/80 81 18 98.1 °F (36.7 °C) 100 %     Physical Exam    Nursing note and vitals reviewed.  Constitutional: No distress.   Obese.   HENT:   Mouth/Throat: Oropharynx is clear and moist. No oropharyngeal exudate.   Neck: Normal range of motion. Neck supple.   Cardiovascular: Normal rate, regular rhythm and normal heart sounds.   No murmur heard.  Pulmonary/Chest: He has no wheezes. He has no rhonchi. He has no rales.   Breath sounds decreased diffusely.    Abdominal: Soft. There is no tenderness. There is no rebound and no guarding.   Musculoskeletal: He exhibits edema.   Trace lower extremity edema.   Neurological: He is alert and oriented to person, place, and time. He has normal strength. No cranial nerve deficit or sensory deficit.   Skin: No rash noted.         ED Course   Procedures  Labs Reviewed   CBC W/ AUTO  DIFFERENTIAL - Abnormal; Notable for the following:        Result Value    MCV 79 (*)     MCH 25.5 (*)     RDW 18.3 (*)     Gran% 75.8 (*)     Lymph% 15.2 (*)     All other components within normal limits    Narrative:     PLEASE REVIEW ORDER START TIME BEFORE MARKING SPECIMEN  COLLECTED.   COMPREHENSIVE METABOLIC PANEL - Abnormal; Notable for the following:     CO2 22 (*)     Glucose 117 (*)     Albumin 3.2 (*)     All other components within normal limits    Narrative:     PLEASE REVIEW ORDER START TIME BEFORE MARKING SPECIMEN  COLLECTED.   PROTIME-INR    Narrative:     PLEASE REVIEW ORDER START TIME BEFORE MARKING SPECIMEN  COLLECTED.   TROPONIN I    Narrative:     PLEASE REVIEW ORDER START TIME BEFORE MARKING SPECIMEN  COLLECTED.   B-TYPE NATRIURETIC PEPTIDE    Narrative:     PLEASE REVIEW ORDER START TIME BEFORE MARKING SPECIMEN  COLLECTED.   MAGNESIUM    Narrative:     PLEASE REVIEW ORDER START TIME BEFORE MARKING SPECIMEN  COLLECTED.     EKG Readings: (Independently Interpreted)   Normal sinus rhythm at 69 bpm with flattened T waves laterally without significant change from previous.           Medical Decision Making:   History:   Old Medical Records: I decided to obtain old medical records.  Initial Assessment:       Patient with multiple co-morbidities including CAD, CHF, and COPD presents with exertional chest pressure for days. No active CP or signs of fluid overload on exam. Given exertional component and risk factors will do ACS workup and give aspirin now. Pt is unsure when he had a recent stress test and there is none noted in our system. He also complains of a chronic headache, but there are no concerning exam findings to suggest intracranial hemorrhage or stroke.     12:52 AM  Initial ACS workup was negative. CXR read as possible early congestion, but pt with normal BNP and lying flat with normal O2. Recommended admission for serial troponins and possible stress testing, but patient does not feel  ready to do stress test at this time and left AMA without waiting to do second troponin. He has full decision making mental capacity and understands the risks of leaving without admission of stress test, including missed MI, disability, and death. He is referred to PCP and Cardiology for outpatient stress and will return for any worsening      Independently Interpreted Test(s):   I have ordered and independently interpreted EKG Reading(s) - see prior notes  Clinical Tests:   Lab Tests: Ordered and Reviewed  Radiological Study: Ordered and Reviewed  Medical Tests: Ordered and Reviewed            Scribe Attestation:   Scribe #1: I performed the above scribed service and the documentation accurately describes the services I performed. I attest to the accuracy of the note.    Attending Attestation:           Physician Attestation for Scribe:  Physician Attestation Statement for Scribe #1: I, Dr. Juan, reviewed documentation, as scribed by Antonio Souza in my presence, and it is both accurate and complete.                 ED Course     Clinical Impression:   The encounter diagnosis was Chest pain, unspecified type.    Disposition:   Disposition: LUIS ALBERTO Juan MD  02/28/17 0354

## 2017-02-28 NOTE — ED NOTES
Pt refusing to have labs drawn. States that he wants to leave. Informed patient of the importance of getting labs drawn. Pt states that he still wants to go and needs a cab voucher. Notified Dr. Estrada

## 2017-03-09 ENCOUNTER — TELEPHONE (OUTPATIENT)
Dept: SLEEP MEDICINE | Facility: OTHER | Age: 47
End: 2017-03-09

## 2017-03-09 NOTE — TELEPHONE ENCOUNTER
Patient no showed twice for his sleep study.  Left several messages and send out a message through my [facility].  No response.

## 2017-03-29 ENCOUNTER — TELEPHONE (OUTPATIENT)
Dept: PULMONOLOGY | Facility: CLINIC | Age: 47
End: 2017-03-29

## 2017-03-29 NOTE — TELEPHONE ENCOUNTER
----- Message from Amanda Mai sent at 3/28/2017  3:05 PM CDT -----  Contact: Rajendra    tel:    [phone]  Pt. Is Asking for Dr. Roe or the nurse to call him to discuss his machine and supplies.  Wants to have another sleep study done.    Pls call.

## 2017-03-29 NOTE — TELEPHONE ENCOUNTER
rosalina pt was given a number to the sleep lab and to ask for ms harrison to schedule sleep study

## 2017-05-02 ENCOUNTER — OFFICE VISIT (OUTPATIENT)
Dept: SLEEP MEDICINE | Facility: CLINIC | Age: 47
End: 2017-05-02
Payer: MEDICAID

## 2017-05-02 VITALS
OXYGEN SATURATION: 94 % | DIASTOLIC BLOOD PRESSURE: 90 MMHG | HEART RATE: 70 BPM | SYSTOLIC BLOOD PRESSURE: 130 MMHG | WEIGHT: 315 LBS | BODY MASS INDEX: 50.18 KG/M2

## 2017-05-02 DIAGNOSIS — J44.81 BRONCHIOLITIS OBLITERANS SYNDROME: ICD-10-CM

## 2017-05-02 DIAGNOSIS — G47.33 OSA (OBSTRUCTIVE SLEEP APNEA): Primary | ICD-10-CM

## 2017-05-02 DIAGNOSIS — E66.01 MORBID OBESITY DUE TO EXCESS CALORIES: ICD-10-CM

## 2017-05-02 PROCEDURE — 99999 PR PBB SHADOW E&M-EST. PATIENT-LVL III: CPT | Mod: PBBFAC,,, | Performed by: INTERNAL MEDICINE

## 2017-05-02 PROCEDURE — 99213 OFFICE O/P EST LOW 20 MIN: CPT | Mod: PBBFAC | Performed by: INTERNAL MEDICINE

## 2017-05-02 PROCEDURE — 99214 OFFICE O/P EST MOD 30 MIN: CPT | Mod: S$PBB,,, | Performed by: INTERNAL MEDICINE

## 2017-05-02 NOTE — PROGRESS NOTES
Rajendra Al  was seen as a follow up.    CHIEF COMPLAINT:  Sleep Apnea      HISTORY OF PRESENT ILLNESS: Rajendra Al Jr. is a 47 y.o. male with pmh of ild, obesity, chf, pulmonary htn is here for pulmonary follow up.  Our first 5/1/13.  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  Patient was followed by Dr. Davenport since 1/12-1/13.  Patient had conflict with Dr. Davenport and transferred to me.  Patient was on chronic steroid.  During initial visit, patient was on 30 mg prednisone daily.  Patient has been on prednisone since 2012.  No orthopnea.  No pnd.  Per patient, he is able to walk 1/2 mile without dyspnea.  No chest pain.  +chronic lower extremities edema.  No fever/chills.    [place] 3/29/14-4/2/[place] at [place].  Patient has seen Dr. Sandoval as outpatient.  Patient was not satisfied with Dr. Sandoval.     Last appointment was 12/16.  Patient is here for follow up.  Breathing is the same.  No coughing or wheezing.  No chest pain.  Have not been using cpap regularly.  Patient was set up with sleep study but cancelled for unclear reason.      PAST MEDICAL HISTORY:    Active Ambulatory Problems     Diagnosis Date Noted    HTN (hypertension) 04/23/2013    HLD (hyperlipidemia) 04/23/2013    DM (diabetes mellitus) 04/23/2013    Dilated cardiomyopathy 04/23/2013    Obesity 04/23/2013    ILD (interstitial lung disease) 04/23/2013    Pulmonary HTN 04/23/2013    CHF exacerbation 07/05/2013    DM (diabetes mellitus) type I uncontrolled with renal manifestation 07/06/2013    COPD exacerbation 07/06/2013    CKD (chronic kidney disease) stage 3, GFR 30-59 ml/min 07/06/2013    Paranoid schizophrenia, chronic condition 07/09/2013    Anasarca 07/26/2013    Systolic and diastolic CHF, acute on chronic 07/27/2013    Hypoxia 07/27/2013    COPD (chronic obstructive pulmonary disease) 07/27/2013    Schizophrenia 07/27/2013    EKG abnormalities 12/31/2013     Morbid obesity 07/12/2014    Combined systolic and diastolic heart failure 07/12/2014    Chest pain on exertion 07/12/2014    Pulmonary edema 02/16/2015    H/O medication noncompliance 02/16/2015    Acute pulpitis     Abdominal pain 10/21/2016    SOB (shortness of breath) 11/26/2016     Resolved Ambulatory Problems     Diagnosis Date Noted    Leukocytosis 07/06/2013    Chest pain 07/12/2014     Past Medical History:   Diagnosis Date    CHF (congestive heart failure)     COPD (chronic obstructive pulmonary disease)     Coronary artery disease     Diabetes mellitus     H/O medication noncompliance     Hypertension     ILD (interstitial lung disease)     Obesity     Ptosis of eyelid, bilateral                 PAST SURGICAL HISTORY:    Past Surgical History:   Procedure Laterality Date    BELPHAROPTOSIS REPAIR      CATARACT EXTRACTION, BILATERAL      as child    EYE SURGERY      left heart catherization      LUNG BIOPSY           FAMILY HISTORY:                Family History   Problem Relation Age of Onset    Cancer Maternal Grandmother     Hyperlipidemia Father     Diabetes Mother     Diabetes Sister     Heart disease Sister     Hypertension Sister     Diabetes Brother     Diabetes Daughter     Diabetes Brother     Diabetes Brother     Diabetes Sister        SOCIAL HISTORY:          Tobacco:   History   Smoking Status    Former Smoker    Packs/day: 0.25    Years: 3.00    Quit date: 5/1/2012   Smokeless Tobacco    Not on file       alcohol use:    History   Alcohol Use No     Comment: occassionally                 Occupation: disable; no pets; no foreign travel.    ALLERGIES:  Review of patient's allergies indicates:  No Known Allergies    CURRENT MEDICATIONS:    Current Outpatient Prescriptions   Medication Sig Dispense Refill    albuterol (PROVENTIL HFA) 90 mcg/actuation inhaler Inhale 2 puffs into the lungs every 6 (six) hours as needed for Wheezing. 18 g 1    amlodipine  "(NORVASC) 10 MG tablet Take 10 mg by mouth once daily.       APIDRA SOLOSTAR 100 unit/mL InPn pen Inject 25 Units into the skin 3 (three) times daily.       atorvastatin (LIPITOR) 20 MG tablet Take 1 tablet (20 mg total) by mouth once daily. 30 tablet 6    BD INSULIN PEN NEEDLE UF SHORT 31 X 5/16 " Ndle   3    cyclobenzaprine (FLEXERIL) 10 MG tablet Take 10 mg by mouth every evening.       fish oil-omega-3 fatty acids 300-1,000 mg capsule Take 1 g by mouth once daily.       fluticasone (FLONASE) 50 mcg/actuation nasal spray 1 spray by Each Nare route once daily. (Patient taking differently: 1 spray by Each Nare route daily as needed (for allergies). ) 16 g 5    furosemide (LASIX) 40 MG tablet Take 40 mg by mouth once daily.       hydrOXYzine (ATARAX) 50 MG tablet Take 50 mg by mouth every evening.       LANTUS SOLOSTAR 100 unit/mL (3 mL) InPn pen Inject 80 Units into the skin every evening.   5    lisinopril (PRINIVIL,ZESTRIL) 40 MG tablet Take 40 mg by mouth once daily.      metoprolol succinate (TOPROL-XL) 100 MG 24 hr tablet Take 150 mg by mouth once daily.       potassium chloride SA (K-DUR,KLOR-CON) 20 MEQ tablet Take 1 tablet (20 mEq total) by mouth 2 (two) times daily. (Patient taking differently: Take one tablet by mouth once daily) 60 tablet 0    sertraline (ZOLOFT) 50 MG tablet Take 50 mg by mouth once daily.      spironolactone (ALDACTONE) 25 MG tablet Take 25 mg by mouth once daily.       trazodone (DESYREL) 150 MG tablet Take 100 mg by mouth nightly.   2    TRUETEST TEST STRIPS Strp Check blood sugar once daily      gabapentin (NEURONTIN) 400 MG capsule Take 1 capsule (400 mg total) by mouth 3 (three) times daily. 90 capsule 0     No current facility-administered medications for this visit.                   REVIEW OF SYSTEMS:     Pulmonary related symptoms as per HPI.  Gen:  +weigh gain 20# since 2012, no fever, no night sweat  HEENT:  +blurry visual disturbance, no sore throat, normal " hearing acuity  CV:  No chest pain, no orthopnea, no PND  GI:  no melena, no hematochezia, no diarhea, no constipation.  :  no dysuria, no hematuria, no hesistancy, no dribbling  Neuro:  no syncope, no vertigo, no tinitus  Psych:  No homocide or suicide ideation; no depression.  Endocrine:  No heat or cold intolerance.  Sleep:  + snoring; no witnessed apnea.  +fatigue upon awakening.  No prior sleep study.    Otherwise, a balance of systems reviewed is negative.          PHYSICAL EXAM:  Vitals:    05/02/17 0923   BP: (!) 130/90   Pulse: 70   SpO2: (!) 94%   Weight: (!) 149.7 kg (330 lb)   PainSc:   2   PainLoc: Hand     Body mass index is 50.18 kg/(m^2).     GENERAL:  well develop; no apparent distress  HEENT:  no nasal congestion; no discharge noted;  NECK:  supple; no palpable masses.  CARDIO: regular rate and rhythm  PULM:  clear to auscultation bilaterally; no intercostals retractions; no accessory muscle usage   ABDOMEN:  soft nontender/nondistended.  +bowel sound  EXTREMITIES no cce  NEURO:  CN II-XII intact.  5/5 motor in all extremities.  sensation grossly intact   to light touch.  PSYCH:  normal affect.  Alert and oriented x 4    LABS  Pulmonary Functions Testing Results:     6 min walk 366 meters 96-90-97 on room  2/11/14 (at [facility]) fvc 55%; fev 55%; ratio 81%; dlco 53%  6 min walk 6/14/13 (at [facility]) 350 meters with desat 87%  6/5/13 fvc 54%; fev1 60%; ratio 83%; tlc 59%; dlco 65%  5/9/14 (@[facility]) ratio of 79%; fvc 49%; fev 48%  5/27/15 fvc 62%; fev1 61%; ratio 81%; tlc 62%; dlco 58%  12/15 ratio 80%; fvc 45%; fev1 44%; tlc 50%; dlco 54%  abg at [facility] 11/19/12 7.44/45/120/30 on 3 liters of nasal canula    11/29/12 7.16/90/62/30 on bipap 18/6    CT CHEST: 7/16/12 no pe; patchy area of ggo and airspace dz; improve when compare to 6/12/12 4/16/13 at [facility]; resolution of bilateral ggo when compare to CT 6/12 at [facility]  5/28/14 improve aeration bilaterally when compared to 7/6/12.  +mosaic attenuation  of lungs bilaterally.  CTA 9/30/15 no pe; +mosaic attenuation bilaterally.      PPD none          Echo 5/1/13   1 - Upper limit of normal ascending aorta.   2 - Mild left atrial enlargement.   3 - Eccentric hypertrophy.   4 - Mildly depressed left ventricular function (EF 50%).   5 - Diastolic dysfunction.     Path report at [facility] (reviewed at [place]) diffuse alveolar hemorrhage without vasculitis; small airway disease consistent with constrictive bronchiolitis  6/5/13 hypersensitivity pannel is negative; partha, anca, anti Jo1, scl 70 negative    ASSESSMENT  1. GONZALEZ (obstructive sleep apnea)  CPAP/BIPAP SUPPLIES   2. Bronchiolitis obliterans syndrome     3. Morbid obesity due to excess calories  Ambulatory Referral to Nutrition - EFC       PLAN:   -constrictive bronchiolitis -proven by open lung biopsy. pft improve slightly.  Appear to be idiopathic in nature.  Off prednisone.  Stopped erythromycin on his own.  Unclear if it is helping him.     -obestiy - aware of need for drastic weigh loss.  Patient is a candidate for bariatric surgery.  Advice patient of need for cpap compliance prior to considering bariatric surgery.  Will refer to [facility].    -dyspnea - multifactorial.  ild + habitus +/- pulmonary htn.  Patient deferred rhc in the past.     -gonzalez - recommend resumption of cpap.  Advice patient to bring cpap for next visit.  Unable to retrieve record from [facility].  Patient have been cancelling sleep study appointment.  Patient brought bipap machine and confirmed pressure of 19/13.  bipap adjusted to 19/10.  Will need to bring back for psg.  Will switch to nasal mask per patient request.      -nasal congestion - nasal steroid and sinus irrigation.    -Hypoxemia - ild + hypoventilation.  continue with oxygen atc.      This is 25 minutes visit, over 50% of time spent in direct consultation with patient.     Patient will Return in about 6 months (around 11/2/2017).

## 2017-05-12 ENCOUNTER — TELEPHONE (OUTPATIENT)
Dept: SLEEP MEDICINE | Facility: CLINIC | Age: 47
End: 2017-05-12

## 2017-05-12 DIAGNOSIS — G47.33 OSA (OBSTRUCTIVE SLEEP APNEA): Primary | ICD-10-CM

## 2017-05-12 NOTE — TELEPHONE ENCOUNTER
----- Message from Violet Cadena sent at 5/11/2017 11:30 AM CDT -----  Contact: Pt: [phone]  Pt called and is asking for a callback for some tubing for his cpap machine as well as a mask.  Pt can be reached at [phone].  Please call.    Thanks

## 2017-09-17 ENCOUNTER — HOSPITAL ENCOUNTER (EMERGENCY)
Facility: HOSPITAL | Age: 47
Discharge: HOME | End: 2017-09-17
Attending: EMERGENCY MEDICINE
Payer: MEDICAID

## 2017-09-17 VITALS
HEIGHT: 69 IN | RESPIRATION RATE: 20 BRPM | SYSTOLIC BLOOD PRESSURE: 142 MMHG | BODY MASS INDEX: 46.65 KG/M2 | WEIGHT: 315 LBS | DIASTOLIC BLOOD PRESSURE: 80 MMHG | OXYGEN SATURATION: 95 % | HEART RATE: 85 BPM | TEMPERATURE: 98 F

## 2017-09-17 DIAGNOSIS — J44.1 COPD WITH ACUTE EXACERBATION: Primary | ICD-10-CM

## 2017-09-17 DIAGNOSIS — R06.02 SHORTNESS OF BREATH: ICD-10-CM

## 2017-09-17 DIAGNOSIS — R07.9 CHEST PAIN: ICD-10-CM

## 2017-09-17 LAB
ALBUMIN SERPL BCP-MCNC: 3 G/DL
ALP SERPL-CCNC: 65 U/L
ALT SERPL W/O P-5'-P-CCNC: 16 U/L
ANION GAP SERPL CALC-SCNC: 11 MMOL/L
AST SERPL-CCNC: 16 U/L
BASOPHILS # BLD AUTO: 0.01 K/UL
BASOPHILS NFR BLD: 0.1 %
BILIRUB SERPL-MCNC: 0.5 MG/DL
BNP SERPL-MCNC: 35 PG/ML
BUN SERPL-MCNC: 14 MG/DL
CALCIUM SERPL-MCNC: 9.3 MG/DL
CHLORIDE SERPL-SCNC: 105 MMOL/L
CO2 SERPL-SCNC: 25 MMOL/L
CREAT SERPL-MCNC: 1.5 MG/DL
DIFFERENTIAL METHOD: ABNORMAL
EOSINOPHIL # BLD AUTO: 0.5 K/UL
EOSINOPHIL NFR BLD: 3.8 %
ERYTHROCYTE [DISTWIDTH] IN BLOOD BY AUTOMATED COUNT: 17.6 %
EST. GFR  (AFRICAN AMERICAN): >60 ML/MIN/1.73 M^2
EST. GFR  (NON AFRICAN AMERICAN): 54 ML/MIN/1.73 M^2
GLUCOSE SERPL-MCNC: 80 MG/DL
HCT VFR BLD AUTO: 48.6 %
HGB BLD-MCNC: 15.5 G/DL
LYMPHOCYTES # BLD AUTO: 1.7 K/UL
LYMPHOCYTES NFR BLD: 14.1 %
MAGNESIUM SERPL-MCNC: 1.8 MG/DL
MCH RBC QN AUTO: 25.2 PG
MCHC RBC AUTO-ENTMCNC: 31.9 G/DL
MCV RBC AUTO: 79 FL
MONOCYTES # BLD AUTO: 1.1 K/UL
MONOCYTES NFR BLD: 9.1 %
NEUTROPHILS # BLD AUTO: 8.7 K/UL
NEUTROPHILS NFR BLD: 72.9 %
PLATELET # BLD AUTO: 261 K/UL
PMV BLD AUTO: 9.2 FL
POTASSIUM SERPL-SCNC: 4.1 MMOL/L
PROT SERPL-MCNC: 6.9 G/DL
RBC # BLD AUTO: 6.14 M/UL
SODIUM SERPL-SCNC: 141 MMOL/L
TROPONIN I SERPL DL<=0.01 NG/ML-MCNC: 0.01 NG/ML
WBC # BLD AUTO: 11.95 K/UL

## 2017-09-17 PROCEDURE — 83880 ASSAY OF NATRIURETIC PEPTIDE: CPT

## 2017-09-17 PROCEDURE — 93010 ELECTROCARDIOGRAM REPORT: CPT | Mod: ,,, | Performed by: INTERNAL MEDICINE

## 2017-09-17 PROCEDURE — 94761 N-INVAS EAR/PLS OXIMETRY MLT: CPT

## 2017-09-17 PROCEDURE — 93005 ELECTROCARDIOGRAM TRACING: CPT

## 2017-09-17 PROCEDURE — 84484 ASSAY OF TROPONIN QUANT: CPT

## 2017-09-17 PROCEDURE — 85025 COMPLETE CBC W/AUTO DIFF WBC: CPT

## 2017-09-17 PROCEDURE — 83735 ASSAY OF MAGNESIUM: CPT

## 2017-09-17 PROCEDURE — 94644 CONT INHLJ TX 1ST HOUR: CPT

## 2017-09-17 PROCEDURE — 94640 AIRWAY INHALATION TREATMENT: CPT

## 2017-09-17 PROCEDURE — 99284 EMERGENCY DEPT VISIT MOD MDM: CPT | Mod: 25

## 2017-09-17 PROCEDURE — 80053 COMPREHEN METABOLIC PANEL: CPT

## 2017-09-17 PROCEDURE — 25000003 PHARM REV CODE 250: Performed by: EMERGENCY MEDICINE

## 2017-09-17 PROCEDURE — 25000242 PHARM REV CODE 250 ALT 637 W/ HCPCS: Performed by: EMERGENCY MEDICINE

## 2017-09-17 RX ORDER — GABAPENTIN 800 MG/1
800 TABLET ORAL 3 TIMES DAILY
Qty: 90 TABLET | Refills: 3 | Status: SHIPPED | OUTPATIENT
Start: 2017-09-17 | End: 2018-09-17

## 2017-09-17 RX ORDER — ASPIRIN 325 MG
325 TABLET ORAL
Status: COMPLETED | OUTPATIENT
Start: 2017-09-17 | End: 2017-09-17

## 2017-09-17 RX ORDER — TRAZODONE HYDROCHLORIDE 100 MG/1
100 TABLET ORAL NIGHTLY
Qty: 30 TABLET | Refills: 2 | Status: SHIPPED | OUTPATIENT
Start: 2017-09-17

## 2017-09-17 RX ORDER — INSULIN GLARGINE 100 [IU]/ML
80 INJECTION, SOLUTION SUBCUTANEOUS NIGHTLY
Qty: 1 BOX | Refills: 2 | Status: SHIPPED | OUTPATIENT
Start: 2017-09-17

## 2017-09-17 RX ORDER — INSULIN GLULISINE 100 [IU]/ML
20 INJECTION, SOLUTION SUBCUTANEOUS 3 TIMES DAILY
Qty: 1 BOX | Refills: 2 | Status: SHIPPED | OUTPATIENT
Start: 2017-09-17

## 2017-09-17 RX ORDER — LISINOPRIL 40 MG/1
40 TABLET ORAL DAILY
Qty: 30 TABLET | Refills: 2 | Status: SHIPPED | OUTPATIENT
Start: 2017-09-17

## 2017-09-17 RX ORDER — ATORVASTATIN CALCIUM 20 MG/1
20 TABLET, FILM COATED ORAL DAILY
Qty: 30 TABLET | Refills: 2 | Status: SHIPPED | OUTPATIENT
Start: 2017-09-17

## 2017-09-17 RX ORDER — LEVOFLOXACIN 750 MG/1
750 TABLET ORAL DAILY
Qty: 7 TABLET | Refills: 0 | Status: SHIPPED | OUTPATIENT
Start: 2017-09-17 | End: 2017-09-24

## 2017-09-17 RX ORDER — PREDNISONE 20 MG/1
60 TABLET ORAL DAILY
Qty: 15 TABLET | Refills: 0 | Status: SHIPPED | OUTPATIENT
Start: 2017-09-17 | End: 2017-09-22

## 2017-09-17 RX ORDER — METOPROLOL SUCCINATE 100 MG/1
100 TABLET, EXTENDED RELEASE ORAL DAILY
Qty: 30 TABLET | Refills: 2 | Status: SHIPPED | OUTPATIENT
Start: 2017-09-17

## 2017-09-17 RX ORDER — FUROSEMIDE 40 MG/1
40 TABLET ORAL DAILY
Qty: 30 TABLET | Refills: 2 | Status: SHIPPED | OUTPATIENT
Start: 2017-09-17

## 2017-09-17 RX ORDER — POTASSIUM CHLORIDE 20 MEQ/1
TABLET, EXTENDED RELEASE ORAL
Qty: 30 TABLET | Refills: 2 | Status: SHIPPED | OUTPATIENT
Start: 2017-09-17

## 2017-09-17 RX ORDER — IPRATROPIUM BROMIDE 0.5 MG/2.5ML
0.5 SOLUTION RESPIRATORY (INHALATION)
Status: COMPLETED | OUTPATIENT
Start: 2017-09-17 | End: 2017-09-17

## 2017-09-17 RX ORDER — AMLODIPINE BESYLATE 10 MG/1
10 TABLET ORAL DAILY
Qty: 30 TABLET | Refills: 2 | Status: SHIPPED | OUTPATIENT
Start: 2017-09-17

## 2017-09-17 RX ORDER — ENALAPRILAT 1.25 MG/ML
0.62 INJECTION INTRAVENOUS
Status: DISCONTINUED | OUTPATIENT
Start: 2017-09-17 | End: 2017-09-17

## 2017-09-17 RX ORDER — SPIRONOLACTONE 25 MG/1
25 TABLET ORAL DAILY
Qty: 30 TABLET | Refills: 2 | Status: SHIPPED | OUTPATIENT
Start: 2017-09-17

## 2017-09-17 RX ORDER — ALBUTEROL SULFATE 2.5 MG/.5ML
10 SOLUTION RESPIRATORY (INHALATION)
Status: COMPLETED | OUTPATIENT
Start: 2017-09-17 | End: 2017-09-17

## 2017-09-17 RX ADMIN — IPRATROPIUM BROMIDE 0.5 MG: 0.5 SOLUTION RESPIRATORY (INHALATION) at 05:09

## 2017-09-17 RX ADMIN — ASPIRIN 325 MG ORAL TABLET 325 MG: 325 PILL ORAL at 05:09

## 2017-09-17 RX ADMIN — ALBUTEROL SULFATE 10 MG: 2.5 SOLUTION RESPIRATORY (INHALATION) at 05:09

## 2017-09-17 NOTE — ED NOTES
"Pt has multiple wristbands from different hospitals on both wrists. When tech went to cut off excess wristbands, pt states "no, dont cut them off, I have my reasons to keep them on and I will tell the nurses which one to use. Dont cut them, I want them on."   "

## 2017-09-17 NOTE — ED PROVIDER NOTES
"Encounter Date: 9/17/2017    SCRIBE #1 NOTE: I, Andrea Rodriguez, am scribing for, and in the presence of,  Jeff Zepeda MD. I have scribed the following portions of the note - Other sections scribed: ROS, HPI.       History     Chief Complaint   Patient presents with    Chest Pain     chest pain with sob that started 3 days ago. "supposed to be on home 02 but I dont use it all the time" diagnosed with pneumonia 2 weeks ago     CC: Chest Pain    HPI: Patient is a 47 y.o. M with a past medical history of Asthma; CHF; COPD; Coronary artery disease; Diabetes mellitus; Hypertension; ILD; Obesity; GONZALEZ; and Ptosis of eyelid, bilateral who presents to the ED for evaluation of "burning" midsternal chest pain and exertional dyspnea which began acutely 4 days ago.Chest pain is unaffected by positional changes and is not worse at night. No symptomatic treatment prior to arrival. Patient denies cough, fever, nausea, and/or emesis. He was diagnosed with pneumonia 10 days and given a prescription for Levaquin which he has not yet filled. Patient also reports being out of Albuterol for his home nebulizer.        The history is provided by the patient. No  was used.     Review of patient's allergies indicates:  No Known Allergies  Past Medical History:   Diagnosis Date    CHF (congestive heart failure)     COPD (chronic obstructive pulmonary disease)     Coronary artery disease     Diabetes mellitus     H/O medication noncompliance     Hypertension     ILD (interstitial lung disease)     Obesity     Ptosis of eyelid, bilateral      Past Surgical History:   Procedure Laterality Date    BELPHAROPTOSIS REPAIR      CATARACT EXTRACTION, BILATERAL      as child    EYE SURGERY      left heart catherization      LUNG BIOPSY       Family History   Problem Relation Age of Onset    Cancer Maternal Grandmother     Hyperlipidemia Father     Diabetes Mother     Diabetes Sister     Heart disease Sister  " "   Hypertension Sister     Diabetes Brother     Diabetes Daughter     Diabetes Brother     Diabetes Brother     Diabetes Sister      Social History   Substance Use Topics    Smoking status: Former Smoker     Packs/day: 0.25     Years: 3.00     Quit date: 5/1/2012    Smokeless tobacco: Not on file    Alcohol use No      Comment: occassionally     Review of Systems   Constitutional: Negative for diaphoresis and fever.   HENT: Negative for sore throat.    Eyes: Negative for pain.   Respiratory: Positive for shortness of breath.    Cardiovascular: Positive for chest pain (midsternal, "burning").   Gastrointestinal: Negative for nausea and vomiting.   Genitourinary: Negative for dysuria.   Musculoskeletal: Negative for back pain.   Skin: Negative for rash.   Neurological: Negative for headaches.       Physical Exam     Initial Vitals [09/17/17 1603]   BP Pulse Resp Temp SpO2   (!) 166/97 97 20 98.5 °F (36.9 °C) 95 %      MAP       120         Physical Exam  Nursing note and vitals reviewed.  Constitutional:  Morbidly obese middle-age male in no obvious distress.  HENT:    Head: NC/AT    Eyes: Conjunctivae normal.   (-) scleral icterus.              Mouth/Throat: MMM.      Neck: Neck supple, normal rom.    Cardiovascular: RRR  Pulmonary/Chest: CTAB   Abdominal: Soft.  Morbidly obese.  ND/NT   (-) CVA tenderness.  Musculoskeletal: FROM of all major joints.  +1 pitting edema bilateral lower extremities without specific tenderness.    Neuro:  A&Ox4.  No acute focal motor deficits.     Skin: Skin is warm and dry.   Psychiatric: normal mood and affect.      ED Course   Procedures  Labs Reviewed   CBC W/ AUTO DIFFERENTIAL - Abnormal; Notable for the following:        Result Value    MCV 79 (*)     MCH 25.2 (*)     MCHC 31.9 (*)     RDW 17.6 (*)     Gran # 8.7 (*)     Mono # 1.1 (*)     Lymph% 14.1 (*)     All other components within normal limits   COMPREHENSIVE METABOLIC PANEL - Abnormal; Notable for the following:  "    Creatinine 1.5 (*)     Albumin 3.0 (*)     eGFR if non  54 (*)     All other components within normal limits   TROPONIN I   B-TYPE NATRIURETIC PEPTIDE   MAGNESIUM          X-Rays:   Independently Interpreted Readings:   Chest X-Ray: Normal heart size.  No infiltrates.  No acute abnormalities.     EKG Readings: (Independently Interpreted)   Initial Reading: No STEMI.   Sinus rhythm, rate 96, prolonged QT, normal axis, nonspecific T-wave abnormalities.        Differential diagnosis:   Initial differential diagnosis includes but is not limited to... Pneumonia, CHF, COPD, PE      Additional Medical Decision Makin-year-old male with history of CHF, hypertension, dyslipidemia, GONZALEZ and COPD who presents the emergency department for evaluation of persistent shortness of breath for the past 2 weeks followed by substernal chest pain ×4 days.  See hpi for additional details.  Physical exam reveals a morbidly obese male in no obvious distress or discomfort.  He has +1 pitting edema bilateral lower extremities without further evidence of fluid overload.  He has no specific lower extremity tenderness to suggest DVT.  Breath sounds diminished otherwise clear to auscultation.  Vital signs reassuring - afebrile.  Chest x-ray, EKG and labs pending.    - - Chest x-ray unremarkable. EKG normal. Basic labs within normal limits. Drug screen positive for benzodiazepines. Patient observed in the emergency department until clinical sobriety was achieved. Findings at this time are most consistent with accidental overdose of a likely benzodiazepine. He has been advised to take medications only as prescribed and refrain from any and all recreational drug use. Return instructions discussed prior to discharge.                 Scribe Attestation:   Scribe #1: I performed the above scribed service and the documentation accurately describes the services I performed. I attest to the accuracy of the note.    Attending  Attestation:           Physician Attestation for Scribe:  Physician Attestation Statement for Scribe #1: I, Jeff Zepeda MD, reviewed documentation, as scribed by Andrea Rodriguez in my presence, and it is both accurate and complete.                 ED Course      Clinical Impression:   The primary encounter diagnosis was COPD with acute exacerbation. Diagnoses of Chest pain and Shortness of breath were also pertinent to this visit.    Disposition:   Disposition: Discharged                        Jeff Zepeda MD  11/13/17 1250

## 2017-09-17 NOTE — ED TRIAGE NOTES
46 yo male comes in with the cc of cc that started on Friday that is intermit.  Pt also diagnosed with pneumonia on the 7th. Pt latia N/V. Pt non compliant with meds. Antibiotics script given, but never filled.

## 2017-09-21 ENCOUNTER — TELEPHONE (OUTPATIENT)
Dept: SLEEP MEDICINE | Facility: CLINIC | Age: 47
End: 2017-09-21

## 2017-09-21 NOTE — TELEPHONE ENCOUNTER
LVM for pt to call the clinic back to schedule a appointment with Jyothi Cummings . Pt wants to discuss o2

## 2017-09-21 NOTE — TELEPHONE ENCOUNTER
----- Message from Amanda Mai sent at 9/21/2017  3:10 PM CDT -----  Contact: Rajendra   tel:   [phone]  Pt.says he urgently wants to see you.    Pt.says before he started coming to [facility] he planned to get an oxygen portable machine.    Needs to get the best machine that would fit his needs.   Asking to be seen by you asap so that he can get his machine asap.       Pls call ref.this.